# Patient Record
Sex: FEMALE | Race: OTHER
[De-identification: names, ages, dates, MRNs, and addresses within clinical notes are randomized per-mention and may not be internally consistent; named-entity substitution may affect disease eponyms.]

---

## 2018-01-03 ENCOUNTER — HOSPITAL ENCOUNTER (EMERGENCY)
Dept: HOSPITAL 62 - ER | Age: 23
Discharge: HOME | End: 2018-01-03
Payer: MEDICAID

## 2018-01-03 VITALS — DIASTOLIC BLOOD PRESSURE: 69 MMHG | SYSTOLIC BLOOD PRESSURE: 131 MMHG

## 2018-01-03 DIAGNOSIS — S82.892A: Primary | ICD-10-CM
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
## 2018-01-03 PROCEDURE — 73630 X-RAY EXAM OF FOOT: CPT

## 2018-01-03 PROCEDURE — 99283 EMERGENCY DEPT VISIT LOW MDM: CPT

## 2018-01-03 PROCEDURE — 2W3RX1Z IMMOBILIZATION OF LEFT LOWER LEG USING SPLINT: ICD-10-PCS | Performed by: EMERGENCY MEDICINE

## 2018-01-03 PROCEDURE — 29515 APPLICATION SHORT LEG SPLINT: CPT

## 2018-01-03 PROCEDURE — L4350 ANKLE CONTROL ORTHO PRE OTS: HCPCS

## 2018-01-03 PROCEDURE — 73610 X-RAY EXAM OF ANKLE: CPT

## 2018-01-03 NOTE — RADIOLOGY REPORT (SQ)
EXAM DESCRIPTION:  FOOT LEFT COMPLETE



COMPLETED DATE/TIME:  1/3/2018 12:56 pm



REASON FOR STUDY:  pain fall twisted ankle



COMPARISON:  Left ankle films same date



NUMBER OF VIEWS:  Three views.



TECHNIQUE:  AP, lateral and oblique  radiographic images acquired of the left foot.



LIMITATIONS:  None.



FINDINGS:  MINERALIZATION: Normal.

BONES: No acute fracture or dislocation.  No worrisome bone lesions.

JOINTS: No effusions.

SOFT TISSUES: Mild dorsal foot soft tissue swelling.  No foreign body.

OTHER: No other significant finding.



IMPRESSION:  NEGATIVE STUDY OF THE LEFT FOOT. NO RADIOGRAPHIC EVIDENCE OF ACUTE INJURY.



TECHNICAL DOCUMENTATION:  JOB ID:  [id number]

 2011 [employer]- All Rights Reserved

## 2018-01-03 NOTE — RADIOLOGY REPORT (SQ)
EXAM DESCRIPTION:  ANKLE LEFT COMPLETE



COMPLETED DATE/TIME:  1/3/2018 12:56 pm



REASON FOR STUDY:  pain fall twisted ankle



COMPARISON:  None.



NUMBER OF VIEWS:  Three views.



TECHNIQUE:  AP, lateral, and oblique radiographic images acquired of the left ankle.



LIMITATIONS:  None.



FINDINGS:  MINERALIZATION: Normal.

BONES: Tiny fracture fragments are seen over the fibulotalar joint worrisome for acute avulsions.  Di
stal tibia, talus, calcaneus otherwise unremarkable

JOINTS: Tibiotalar joint effusion.  No gross disruption of the ankle mortise

SOFT TISSUES: Diffuse lateral soft tissue swelling.  No radiopaque foreign body or soft tissue gas

OTHER: No other significant finding.



IMPRESSION:  Lateral soft tissue swelling with tiny avulsion fragments at the fibulotalar joint, like
ly acute



TECHNICAL DOCUMENTATION:  JOB ID:  [id number]

 2011 [employer]- All Rights Reserved

## 2018-01-03 NOTE — ER DOCUMENT REPORT
ED Extremity Problem, Lower





- General


Chief Complaint: Ankle Injury


Stated Complaint: FALL/LEFT ANKLE INJURY


Time Seen by Provider: 01/03/18 12:14


Mode of Arrival: Ambulatory


Information source: Patient


Notes: 





22-year-old female presents to ED for complaint of left ankle injury.  States 

she stepped off a curb twisting her ankle felt and heard pops and crackles.


TRAVEL OUTSIDE OF THE [place] IN LAST 30 DAYS: No





- HPI


Patient complains to provider of: Injury, Pain, Swelling


Location: Ankle


Occurred: This morning


Where: Public place


Onset/Duration: Sudden


Quality of pain: Sharp, Throbbing


Severity: Mild


Pain Level: 2


Context: Fell


Recent injury: Yes


Associated symptoms: Douglas a crack, Douglas a pop, Painful ambulation


Exacerbated by: Hanging down, Movement, Walking


Relieved by: Nothing





- Related Data


Allergies/Adverse Reactions: 


 





No Known Allergies Allergy (Verified 01/03/18 11:24)


 








Home Medications: 


 Current Home Medications





No Home Medications  01/03/18 [History]











Past Medical History





- General


Information source: Patient





- Social History


Smoking Status: Never Smoker


Cigarette use (# per day): No


Chew tobacco use (# tins/day): No


Smoking Education Provided: No


Frequency of alcohol use: None


Drug Abuse: None


Occupation: Student for massage therapy


Lives with: Parents


Family History: Reviewed & Not Pertinent


Patient has suicidal ideation: No


Patient has homicidal ideation: No





- Past Medical History


Cardiac Medical History: Reports: None


Pulmonary Medical History: Reports: None


EENT Medical History: Reports: None


Neurological Medical History: Reports: None


Endocrine Medical History: Reports: None


Renal/ Medical History: Reports: None


Malignancy Medical History: Reports: None


GI Medical History: Reports: None


Musculoskeltal Medical History: Reports None


Skin Medical History: Reports None


Psychiatric Medical History: Reports: None


Traumatic Medical History: Reports: None


Infectious Medical History: Reports: None


Past Surgical History: Reports: Hx Oral Surgery - Jaw





Review of Systems





- Review of Systems


Constitutional: No symptoms reported


EENT: No symptoms reported


Cardiovascular: No symptoms reported


Respiratory: No symptoms reported


Gastrointestinal: No symptoms reported


Genitourinary: No symptoms reported


Female Genitourinary: No symptoms reported


Musculoskeletal: Ankle swelling - Pain and swelling bruising


Skin: No symptoms reported


Hematologic/Lymphatic: No symptoms reported


Neurological/Psychological: No symptoms reported


-: Yes All other systems reviewed and negative





Physical Exam





- Vital signs


Vitals: 


 











Temp Pulse Resp BP Pulse Ox


 


 98.0 F   92   20   131/69 H  98 


 


 01/03/18 11:27 01/03/18 11:27 01/03/18 11:27 01/03/18 11:27 01/03/18 11:27











Interpretation: Normal





- General


General appearance: Appears well, Alert





- HEENT


Head: Normocephalic, Atraumatic


Eyes: Normal


Pupils: PERRL





- Respiratory


Respiratory status: No respiratory distress


Chest status: Nontender


Breath sounds: Normal


Chest palpation: Normal





- Cardiovascular


Rhythm: Regular


Heart sounds: Normal auscultation


Murmur: No





- Abdominal


Inspection: Normal


Distension: No distension


Bowel sounds: Normal


Tenderness: Nontender


Organomegaly: No organomegaly





- Back


Back: Normal, Nontender





- Extremities


General upper extremity: Normal inspection, Nontender, Normal color, Normal ROM

, Normal temperature


General lower extremity: Normal temperature


Knee: Tender, Abrasion - left, Patellar tendon intact.  No: Dislocation, Drawer'

s test instability, Ecchymosis, Instability, Joint effusion, Laceration, Laxity 

with valgus stress, Laxity with varus stress, Pain with ROM, Popliteal fossa 

tender, Tender joint line, Unable to bear weight


Ankle: Tender, Ecchymosis - Due to pain, Edema, Limited ROM, Unable to bear 

weight.  No: Abrasion, Deformity, Instability, Laceration, Positive Corea's 

test


Foot: Tender, Ecchymosis, No evidence of FB.  No: Edema, Instability, Laceration

, Metatarsal compress. pain, Nail injury, Navicular tenderness, Puncture wound, 

Tender 5th metatarsal, Unable to bear weight, Other





- Neurological


Neuro grossly intact: Yes


Cognition: Normal


Orientation: AAOx4


Jenniffer Coma Scale Eye Opening: Spontaneous


D Lo Coma Scale Verbal: Oriented


D Lo Coma Scale Motor: Obeys Commands


D Lo Coma Scale Total: 15


Speech: Normal


Motor strength normal: LUE, RUE, LLE, RLE


Sensory: Normal





- Psychological


Associated symptoms: Normal affect, Normal mood





- Skin


Skin Temperature: Warm


Skin Moisture: Dry


Skin Color: Normal





Course





- Vital Signs


Vital signs: 


 











Temp Pulse Resp BP Pulse Ox


 


 98.0 F   92   20   131/69 H  98 


 


 01/03/18 11:27  01/03/18 11:27 01/03/18 11:27 01/03/18 11:27  01/03/18 11:27














- Diagnostic Test


Radiology reviewed: Image reviewed, Reports reviewed





Procedures





- Immobilization


  ** Left Ankle


Time completed: 15:00


Pre-Proc Neuro Vasc Exam: Normal


Immobilizer type: Ace wrap, Ankle stirrup


Performed by: PCT


Post-Proc Neuro Vasc Exam: Normal


Alignment checked and good: Yes





Discharge





- Discharge


Clinical Impression: 


Avulsion fracture of left ankle


Qualifiers:


 Encounter type: initial encounter Fracture type: closed Qualified Code(s): 

S82.892A - Other fracture of left lower leg, initial encounter for closed 

fracture





Condition: Stable


Disposition: HOME, SELF-CARE


Additional Instructions: 


Avulsion Fracture of the Ankle





     There is a small chip fracture in your ankle.  This fracture was caused by 

stretching the joint ligaments, which pulled off a small piece of bone.  This 

injury is treated much the same as a severe sprain.


     At first, you should elevate, rest, and apply ice packs to the leg.  Often

, only an ankle brace or tape is necessary while the chip fracture heals.  

Sometimes a chip fracture of this type requires a cast or walking boot.  The 

treatment plan may change, depending on how your ankle progresses.


     Chip fractures usually do not fuse back onto the bone, but rather scar 

down to the bone surface.  You will most likely see this bone fragment on 

future x-rays.


     It's important that you follow the treatment program as outlined for now, 

then follow up for re-evaluation as scheduled.  Call the doctor or return at 

once if pain or swelling becomes severe, or if you develop other unusual 

symptoms.








ACE WRAP:


     A compression dressing (ace wrap) has been placed.  This helps hold the 

area still. It limits swelling and internal bleeding.


     The wrap should be comfortably snug -- not tight.  You should feel a sense 

of pressure, but not severe pain under the wrap.  Unless the physician tells 

you otherwise, you can adjust the wrap for comfort.


     If the wrap causes symptoms suggesting it's too tight -- uncomfortable 

pressure, swelling or discoloration beyond the wrap, numbness, or severe pain -

- you must loosen the wrap.  If these symptoms don't resolve promptly, return 

for re-evaluation.








ANKLE STIRRUP SPLINT:


     You are to use an ankle brace called a stirrup splint.  This type of brace 

allows you to place greater stresses on the ankle without risk of re-injury, 

and is often used for more severe ankle injuries such as avulsion fractures and 

ligament ruptures.


     The splint can be worn over a sock or tape.  For proper support, wear the 

splint with a shoe over it.


     It's important that the splint fit properly.  Adjust the heel tension, if 

needed.  If your splint has air bladders, peel back the bottom of each air 

bladder, then move the Velcro attachment of the heel strap up or down.  Air 

bladder pressure can be adjusted by pulling up the valve at the top, threading 

the air tube down into the main bladder, then blowing air into the bladder or 

squeezing it out.  The two sides of the stirrup can be moved forward or back on 

your ankle by changing the attachment of the main straps.


     If you are unable to use the ankle comfortably in the splint, return for re

-evaluation.








USE OF CRUTCHES:


     The doctor has recommended that you not bear weight at this time. You will 

need to use crutches.  Adjust the crutches so the tops come to about two inches 

under the armpit while you are standing upright.  Use your hands -- not your 

armpits -- to support your weight.


     To get into a chair, support yourself with one crutch on the injured side.

  Hold the chair with the other hand, then lower yourself while putting all 

your weight on the good leg.


     Going up stairs is `good leg up, step up, then bring up crutches and bad 

leg.'  Down stairs is `bad leg and crutches down, then bring good leg down.'


     If you develop numbness or swelling in an arm or hand, you are using the 

crutches incorrectly.  Return if you are having any problems with the crutches.








ICE & ELEVATION:


     Apply ice packs frequently against the painful area.  Many different 

schedules are recommended, such as "20 minutes on, 20 minutes off" or "one hour 

ice, two hours rest."  If you need to work, you may need to go longer between 

ice treatments.  You should plan to have the area ice packed AT LEAST one-

fourth of the time.


     The ice should be applied over the wrap, tape, or splint, or over a layer 

of cloth -- not directly against the skin.  Some ice bags have a built-in cloth 

and can be put directly on the skin.


     Your injured part should be elevated as much as possible over the next 48 

hours.  Try to keep the injury above the level of the heart. Avoid use of the 

injured area.  Elevation and rest will decrease the swelling.








USE OF OVER-THE-COUNTER IBUPROFEN:


     Ibuprofen (Advil, Nuprin, Medipren, Motrin IB) is a medication for fever 

and pain control.  In addition, it has anti- inflammatory effects which may be 

beneficial, especially in the treatment of injuries.


     It's best to take ibuprofen with food.  Persons with ulcer disease or 

allergy to aspirin should notify their physician of this before taking 

ibuprofen.


     Ibuprofen can be given every four to six hours, for a total of four doses 

daily.


     Age              Pain or fever dose          Antiinflammatory dose


     6-8 yr              200 mg (1 tab)                200 mg (1 tab)


     9-11 yr             200 mg (1 tab)                200-400 mg (1-2 tab)


     11-14 yr            200-400 mg (1-2 tab)         400 mg (2 tab)


     15-adult            400 mg (2 tab)                600 mg (3 tab)








ORAL NARCOTIC MEDICATION:


     You have been given a Norco dispense pack for pain control.  This 

medication is a narcotic.  It's best taken with food, as nausea can result if 

taken on an empty stomach.


     Don't operate machinery or drive within six hours of taking this 

medication.  Do not combine this medicine with alcohol, or with any medication 

which can cause sedation (such as cold tablets or sleeping pills) unless you 

get permission from the physician.


     Narcotics tend to cause constipation.  If possible, drink plenty of fluids 

and eat a diet high in fiber and fruits.





     Please be aware that prescription narcotics also have the potential for 

abuse.  People become addicted to these medications because of the general 

sense of wellbeing that they induce.  This feeling along with a significant 

reduction in tension, anxiety, and aggression provides a stimulating seductive 

quality to these drugs.  Once your pain is under control, we encourage you to 

discard your unused narcotics.








FOLLOW-UP CARE:


If you have been referred to a physician for follow-up care, call the physician

s office for an appointment as you were instructed or within the next two days.

  If you experience worsening or a significant change in your symptoms, notify 

the physician immediately or return to the Emergency Department at any time for 

re-evaluation.


Forms:  Elevated Blood Pressure, Return to School


Referrals: 


SILVEIRA BENTON,TRAY L, MD [ACTIVE STAFF] - Follow up as needed

## 2018-07-18 ENCOUNTER — HOSPITAL ENCOUNTER (EMERGENCY)
Dept: HOSPITAL 62 - ER | Age: 23
Discharge: HOME | End: 2018-07-18
Payer: MEDICAID

## 2018-07-18 VITALS — SYSTOLIC BLOOD PRESSURE: 118 MMHG | DIASTOLIC BLOOD PRESSURE: 81 MMHG

## 2018-07-18 DIAGNOSIS — L55.0: Primary | ICD-10-CM

## 2018-07-18 PROCEDURE — 99282 EMERGENCY DEPT VISIT SF MDM: CPT

## 2018-07-18 NOTE — ER DOCUMENT REPORT
HPI





- HPI


Patient complains to provider of: Sunburn


Onset: Other - 3 days ago


Onset/Duration: Persistent


Quality of pain: Burning


Pain Level: 2


Context: 





Patient presents complaining of sunburn to her back.  Patient states she has 

been out in the sun 3 days ago.  Patient denies any fever.  Patient without any 

blistering.


Associated Symptoms: Other - Sunburn


Exacerbated by: Denies


Relieved by: Denies


Similar symptoms previously: No


Recently seen / treated by doctor: No





- ROS


ROS below otherwise negative: Yes


Systems Reviewed and Negative: Yes All other systems reviewed and negative





- CONSTITUTIONAL


Constitutional: DENIES: Fever, Chills





- GASTROINTESTINAL


Gastrointestinal: DENIES: Nausea





- REPRODUCTIVE


LMP: 3 weeks ago





- DERM


Skin Color: Erythema


Skin Problems: Burn - Sunburn





Past Medical History





- General


Information source: Patient





- Social History


Smoking Status: Never Smoker


Frequency of alcohol use: None


Drug Abuse: None


Occupation: Foodservice


Lives with: Spouse/Significant other


Family History: Reviewed & Not Pertinent


Patient has suicidal ideation: No


Patient has homicidal ideation: No





- Medical History


Medical History: Negative


Renal/ Medical History: Denies: Hx Peritoneal Dialysis


Past Surgical History: Reports: Hx Oral Surgery - Jaw





Vertical Provider Document





- CONSTITUTIONAL


Agree With Documented VS: Yes


Exam Limitations: No Limitations


General Appearance: WD/WN, No Apparent Distress





- INFECTION CONTROL


TRAVEL OUTSIDE OF THE [place] IN LAST 30 DAYS: No





- HEENT


HEENT: Atraumatic, Normocephalic





- NECK


Neck: Normal Inspection, Supple





- RESPIRATORY


Respiratory: Breath Sounds Normal, No Respiratory Distress





- CARDIOVASCULAR


Cardiovascular: Regular Rate, Regular Rhythm, No Murmur





- BACK


Back: Normal Inspection





- MUSCULOSKELETAL/EXTREMETIES


Musculoskeletal/Extremeties: MAEW, FROM





- NEURO


Level of Consciousness: Awake, Alert, Appropriate


Motor/Sensory: No Motor Deficit





- DERM


Integumentary: Warm, Dry


Notes: 





Sunburn to the posterior aspect of the back, mild erythema, no blistering.





Course





- Vital Signs


Vital signs: 


 











Temp Pulse Resp BP Pulse Ox


 


 98.5 F   84   20   118/81   98 


 


 07/18/18 06:44  07/18/18 06:44  07/18/18 06:44  07/18/18 06:44  07/18/18 06:44














Discharge





- Discharge


Clinical Impression: 


 Sunburn





Condition: Stable


Disposition: HOME, SELF-CARE


Instructions:  Anti-Inflammatory Medication ([facility]), Sunburn ([facility])


Additional Instructions: 


Return immediately for any new or worsening symptoms





Followup with your primary care provider, call tomorrow to make a followup 

appointment





Wear sunblock when outside 


Prescriptions: 


RX: Hydroxyzine HCl [Atarax 25 mg Tablet] 1 - 2 tab PO QID PRN #20 tablet


 PRN Reason: 


Naproxen [Naprosyn 250 Nmg Tablet] 1 tab PO BID #14 tablet


Forms:  Return to Work


Referrals: 


[facility] [Provider [facility]] - Follow up as needed